# Patient Record
Sex: FEMALE | Race: WHITE | NOT HISPANIC OR LATINO | Employment: FULL TIME | URBAN - METROPOLITAN AREA
[De-identification: names, ages, dates, MRNs, and addresses within clinical notes are randomized per-mention and may not be internally consistent; named-entity substitution may affect disease eponyms.]

---

## 2018-03-24 ENCOUNTER — OFFICE VISIT (OUTPATIENT)
Dept: URGENT CARE | Facility: CLINIC | Age: 33
End: 2018-03-24
Payer: COMMERCIAL

## 2018-03-24 VITALS
WEIGHT: 155 LBS | DIASTOLIC BLOOD PRESSURE: 82 MMHG | HEIGHT: 64 IN | BODY MASS INDEX: 26.46 KG/M2 | TEMPERATURE: 99.4 F | SYSTOLIC BLOOD PRESSURE: 122 MMHG | OXYGEN SATURATION: 100 % | HEART RATE: 94 BPM | RESPIRATION RATE: 16 BRPM

## 2018-03-24 DIAGNOSIS — L25.9 CONTACT DERMATITIS, UNSPECIFIED CONTACT DERMATITIS TYPE, UNSPECIFIED TRIGGER: Primary | ICD-10-CM

## 2018-03-24 PROCEDURE — 99203 OFFICE O/P NEW LOW 30 MIN: CPT | Performed by: PHYSICIAN ASSISTANT

## 2018-03-24 RX ORDER — BUPROPION HYDROCHLORIDE 150 MG/1
TABLET ORAL
COMMUNITY
Start: 2018-01-30

## 2018-03-24 RX ORDER — DROSPIRENONE AND ETHINYL ESTRADIOL TABLETS 0.02-3(28)
KIT ORAL
COMMUNITY
Start: 2018-03-06

## 2018-03-24 RX ORDER — PREDNISONE 50 MG/1
50 TABLET ORAL DAILY
Qty: 5 TABLET | Refills: 0 | Status: SHIPPED | OUTPATIENT
Start: 2018-03-24 | End: 2018-03-29

## 2018-03-24 NOTE — PROGRESS NOTES
[facility]        NAME: Thomas Venegas is a 34 y o  female  : 1985    MRN: [id number]  DATE: 2018  TIME: 1:26 PM    Assessment and Plan   Contact dermatitis, unspecified contact dermatitis type, unspecified trigger [L25 9]  1  Contact dermatitis, unspecified contact dermatitis type, unspecified trigger  predniSONE 50 mg tablet     Patient Instructions     Take steroid as prescribed  otc allergy pill for sxs control  Follow up with PCP in 3-5 days  Proceed to  ER if symptoms worsen  Chief Complaint     Chief Complaint   Patient presents with    Rash     itchy right shoulder rash for 2 weeks treating with OTC went almost away, woke up this morning and back, now on shoulder down arm onto chest and back     History of Present Illness       Rash   This is a new problem  The current episode started yesterday  The problem is unchanged  The affected locations include the left shoulder, right shoulder and neck  The rash is characterized by redness and swelling  It is unknown if there was an exposure to a precipitant  Pertinent negatives include no anorexia, congestion, cough, diarrhea, eye pain, facial edema, fatigue, fever, joint pain, nail changes, rhinorrhea, shortness of breath, sore throat or vomiting  Past treatments include antihistamine  The treatment provided no relief  There is no history of allergies, asthma, eczema or varicella  Review of Systems   Review of Systems   Constitutional: Negative for fatigue and fever  HENT: Negative for congestion, rhinorrhea and sore throat  Eyes: Negative for pain  Respiratory: Negative for cough and shortness of breath  Gastrointestinal: Negative for anorexia, diarrhea and vomiting  Musculoskeletal: Negative for joint pain  Skin: Positive for rash  Negative for color change, nail changes, pallor and wound           Current Medications       Current Outpatient Prescriptions:     buPROPion (WELLBUTRIN XL) 150 mg 24 hr tablet, , Disp: , Rfl:     LORYNA 3-0 02 MG per tablet, , Disp: , Rfl:     predniSONE 50 mg tablet, Take 1 tablet (50 mg total) by mouth daily for 5 days, Disp: 5 tablet, Rfl: 0    Current Allergies     Allergies as of 03/24/2018    (No Known Allergies)            The following portions of the patient's history were reviewed and updated as appropriate: allergies, current medications, past family history, past medical history, past social history, past surgical history and problem list      History reviewed  No pertinent past medical history  Past Surgical History:   Procedure Laterality Date    WISDOM TOOTH EXTRACTION         No family history on file  Medications have been verified  Objective   /82   Pulse 94   Temp 99 4 °F (37 4 °C) (Tympanic)   Resp 16   Ht 5' 4" (1 626 m)   Wt 70 3 kg (155 lb)   SpO2 100%   BMI 26 61 kg/m²        Physical Exam     Physical Exam   Constitutional: She is oriented to person, place, and time  She appears well-developed and well-nourished  Cardiovascular: Normal rate, regular rhythm, normal heart sounds and intact distal pulses  Exam reveals no gallop and no friction rub  No murmur heard  Pulmonary/Chest: Effort normal and breath sounds normal  No respiratory distress  She has no wheezes  She has no rales  She exhibits no tenderness  Abdominal: Soft  Bowel sounds are normal  She exhibits no distension and no mass  There is no tenderness  There is no rebound and no guarding  Neurological: She is alert and oriented to person, place, and time  She displays normal reflexes  No cranial nerve deficit  She exhibits normal muscle tone  Coordination normal    Skin: Rash noted

## 2021-04-08 DIAGNOSIS — Z23 ENCOUNTER FOR IMMUNIZATION: ICD-10-CM

## 2021-04-19 ENCOUNTER — IMMUNIZATIONS (OUTPATIENT)
Dept: FAMILY MEDICINE CLINIC | Facility: HOSPITAL | Age: 36
End: 2021-04-19

## 2021-04-19 DIAGNOSIS — Z23 ENCOUNTER FOR IMMUNIZATION: Primary | ICD-10-CM

## 2021-04-19 PROCEDURE — 0001A SARS-COV-2 / COVID-19 MRNA VACCINE (PFIZER-BIONTECH) 30 MCG: CPT

## 2021-04-19 PROCEDURE — 91300 SARS-COV-2 / COVID-19 MRNA VACCINE (PFIZER-BIONTECH) 30 MCG: CPT

## 2021-05-10 ENCOUNTER — IMMUNIZATIONS (OUTPATIENT)
Dept: FAMILY MEDICINE CLINIC | Facility: HOSPITAL | Age: 36
End: 2021-05-10

## 2021-05-10 DIAGNOSIS — Z23 ENCOUNTER FOR IMMUNIZATION: Primary | ICD-10-CM

## 2021-05-10 PROCEDURE — 0002A SARS-COV-2 / COVID-19 MRNA VACCINE (PFIZER-BIONTECH) 30 MCG: CPT

## 2021-05-10 PROCEDURE — 91300 SARS-COV-2 / COVID-19 MRNA VACCINE (PFIZER-BIONTECH) 30 MCG: CPT

## 2022-04-16 ENCOUNTER — OFFICE VISIT (OUTPATIENT)
Dept: URGENT CARE | Facility: CLINIC | Age: 37
End: 2022-04-16
Payer: COMMERCIAL

## 2022-04-16 VITALS
BODY MASS INDEX: 27.49 KG/M2 | WEIGHT: 161 LBS | OXYGEN SATURATION: 98 % | SYSTOLIC BLOOD PRESSURE: 164 MMHG | HEIGHT: 64 IN | DIASTOLIC BLOOD PRESSURE: 75 MMHG | RESPIRATION RATE: 18 BRPM | TEMPERATURE: 98.5 F | HEART RATE: 85 BPM

## 2022-04-16 DIAGNOSIS — L30.9 DERMATITIS: Primary | ICD-10-CM

## 2022-04-16 PROCEDURE — 99213 OFFICE O/P EST LOW 20 MIN: CPT | Performed by: PHYSICIAN ASSISTANT

## 2022-04-16 PROCEDURE — 96372 THER/PROPH/DIAG INJ SC/IM: CPT | Performed by: PHYSICIAN ASSISTANT

## 2022-04-16 RX ORDER — ALBUTEROL SULFATE 90 UG/1
AEROSOL, METERED RESPIRATORY (INHALATION)
COMMUNITY
Start: 2022-01-07

## 2022-04-16 RX ORDER — METHYLPREDNISOLONE SODIUM SUCCINATE 125 MG/2ML
125 INJECTION, POWDER, LYOPHILIZED, FOR SOLUTION INTRAMUSCULAR; INTRAVENOUS ONCE
Status: COMPLETED | OUTPATIENT
Start: 2022-04-16 | End: 2022-04-16

## 2022-04-16 RX ORDER — METHYLPREDNISOLONE SODIUM SUCCINATE 125 MG/2ML
125 INJECTION, POWDER, LYOPHILIZED, FOR SOLUTION INTRAMUSCULAR; INTRAVENOUS ONCE
Status: DISCONTINUED | OUTPATIENT
Start: 2022-04-16 | End: 2022-04-16

## 2022-04-16 RX ORDER — PREDNISONE 10 MG/1
10 TABLET ORAL DAILY
Qty: 21 TABLET | Refills: 0 | Status: SHIPPED | OUTPATIENT
Start: 2022-04-16

## 2022-04-16 RX ADMIN — METHYLPREDNISOLONE SODIUM SUCCINATE 125 MG: 125 INJECTION, POWDER, LYOPHILIZED, FOR SOLUTION INTRAMUSCULAR; INTRAVENOUS at 11:02

## 2022-04-16 NOTE — PROGRESS NOTES
[facility]        NAME: Ricardo Beltran is a 39 y o  female  : 1985    MRN: [id number]  DATE: 2022  TIME: 11:01 AM    Assessment and Plan   Dermatitis [L30 9]  1  Dermatitis  predniSONE 10 mg tablet    methylPREDNISolone sodium succinate (Solu-MEDROL) injection 125 mg    DISCONTINUED: methylPREDNISolone sodium succinate (Solu-MEDROL) injection 125 mg         Patient Instructions    May use over-the-counter Benadryl in addition to prednisone if needed  Avoid scratching  Cool showers  Ice packs  Observe for signs of infection including redness, cloudy drainage, swelling, streaking up the extremity, fevers and chills, or persistent symptoms  Follow-up with PCP in 3-5 days  Go to ER if symptoms become severe  Follow up with PCP in 3-5 days  Proceed to  ER if symptoms worsen  Chief Complaint     Chief Complaint   Patient presents with    Rash     Started   Generalized w/ itching  Nothing new to the patient to date that she is aware that could have flared this  Took Benadryl  w/o relief         History of Present Illness       Patient is a 48-year-old female with no significant past medical history presents the office complaining of rash for 2 days  Rash began on her legs but is now spreading to her arms, abdomen, and face  Rash is itchy but not painful  She denies fevers, chills, tongue and throat swelling, dysphagia, difficulty breathing, recent illness  Denies new body products, medications, or pets  States she was working in the yard but rash was present prior to this  He tried Benadryl with no relief  Denies prior similar episodes  Review of Systems   Review of Systems   Constitutional: Negative for chills and fever  HENT: Negative for trouble swallowing  Respiratory: Negative for cough and chest tightness  Skin: Positive for rash           Current Medications       Current Outpatient Medications:     albuterol (PROVENTIL HFA,VENTOLIN HFA) 90 mcg/act inhaler, , Disp: , Rfl:     buPROPion (WELLBUTRIN XL) 150 mg 24 hr tablet, , Disp: , Rfl:     etonogestrel (NEXPLANON) subdermal implant, Inject 1 each into the skin, Disp: , Rfl:     LORYNA 3-0 02 MG per tablet, , Disp: , Rfl:     predniSONE 10 mg tablet, Take 1 tablet (10 mg total) by mouth daily Take 6 on day 1, take 5 on day 2, take 4 on day 3, take 3 on day 4, take 2 on day 5, take 1 on day 6 , Disp: 21 tablet, Rfl: 0    Current Facility-Administered Medications:     methylPREDNISolone sodium succinate (Solu-MEDROL) injection 125 mg, 125 mg, Intramuscular, Once, Phani Carpio PA-C    Current Allergies     Allergies as of 04/16/2022    (No Known Allergies)            The following portions of the patient's history were reviewed and updated as appropriate: allergies, current medications, past family history, past medical history, past social history, past surgical history and problem list      Past Medical History:   Diagnosis Date    History of egg donation     x2       Past Surgical History:   Procedure Laterality Date    WISDOM TOOTH EXTRACTION         History reviewed  No pertinent family history  Medications have been verified  Objective   /75   Pulse 85   Temp 98 5 °F (36 9 °C)   Resp 18   Ht 5' 4" (1 626 m)   Wt 73 kg (161 lb)   LMP  (LMP Unknown)   SpO2 98%   BMI 27 64 kg/m²   No LMP recorded (lmp unknown)  (Menstrual status: Birth Control)  Physical Exam     Physical Exam  Vitals and nursing note reviewed  Constitutional:       Appearance: Normal appearance  She is well-developed  HENT:      Head: Normocephalic and atraumatic  Right Ear: Tympanic membrane, ear canal and external ear normal       Left Ear: Tympanic membrane, ear canal and external ear normal       Nose: Nose normal       Mouth/Throat:      Pharynx: Uvula midline     Eyes:      General: Lids are normal       Conjunctiva/sclera: Conjunctivae normal       Pupils: Pupils are equal, round, and reactive to light  Cardiovascular:      Rate and Rhythm: Normal rate and regular rhythm  Pulses: Normal pulses  Heart sounds: Normal heart sounds  No murmur heard  No friction rub  No gallop  Pulmonary:      Effort: Pulmonary effort is normal       Breath sounds: Normal breath sounds  No wheezing, rhonchi or rales  Musculoskeletal:         General: Normal range of motion  Cervical back: Neck supple  Lymphadenopathy:      Cervical: No cervical adenopathy  Skin:     General: Skin is warm and dry  Capillary Refill: Capillary refill takes less than 2 seconds  Findings: Rash (Macular erythematous rash to face, neck, abdomen, arms, and legs  See image ) present  Neurological:      Mental Status: She is alert           Face      Neck      Left hip        Right anterior leg

## 2022-04-16 NOTE — PATIENT INSTRUCTIONS
May use over-the-counter Benadryl in addition to prednisone if needed  Avoid scratching  Cool showers  Ice packs  Observe for signs of infection including redness, cloudy drainage, swelling, streaking up the extremity, fevers and chills, or persistent symptoms  Follow-up with PCP in 3-5 days  Go to ER if symptoms become severe  Dermatitis   WHAT YOU NEED TO KNOW:   Dermatitis is skin inflammation  You may have an itchy rash, redness, or swelling  You may also have bumps or blisters that crust over or ooze clear fluid  Dermatitis can be caused by allergens such as dust mites, pet dander, pollen, and certain foods  It can also develop when something touches your skin and irritates it or causes an allergic reaction  Examples include soaps, chemicals, latex, and poison ivy  DISCHARGE INSTRUCTIONS:   Call your local emergency number (911 in the [address] [facility]) or have someone call if:   · You have symptoms of anaphylaxis, such as sudden trouble breathing, throat swelling, or feeling dizzy or lightheaded  Return to the emergency department if:   · You develop a fever or have red streaks going up your arm or leg  · Your rash gets more swollen, red, or hot  Call your doctor or dermatologist if:   · Your skin blisters, oozes white or yellow pus, or has a foul-smelling discharge  · Your rash spreads or does not get better, even after treatment  · You have questions or concerns about your condition or care  Medicines:   · Medicines  help decrease itching and inflammation, or treat a bacterial infection  They may be given as a topical cream, shot, or a pill  · Take your medicine as directed  Contact your healthcare provider if you think your medicine is not helping or if you have side effects  Tell him of her if you are allergic to any medicine  Keep a list of the medicines, vitamins, and herbs you take  Include the amounts, and when and why you take them   Bring the list or the pill bottles to follow-up visits  Carry your medicine list with you in case of an emergency  Manage dermatitis:   · Apply a cool compress to your rash  This will help soothe your skin  · Apply lotions or creams to the area  These help keep your skin moist and decrease itching  Apply the lotion or cream right after a lukewarm bath or shower when your skin is still damp  Use products that do not contain dye or a scent  · Avoid skin irritants  Examples include makeup, hair products, soaps, and cleansers  Use products that do not contain a scent or dye  Follow up with your doctor or dermatologist as directed:  Write down your questions so you remember to ask them during your visits  © [employer] 2022 Information is for End User's use only and may not be sold, redistributed or otherwise used for commercial purposes  All illustrations and images included in CareNotes® are the copyrighted property of [employer]  or Brandie Garcia  The above information is an  only  It is not intended as medical advice for individual conditions or treatments  Talk to your doctor, nurse or pharmacist before following any medical regimen to see if it is safe and effective for you

## 2023-10-18 ENCOUNTER — APPOINTMENT (EMERGENCY)
Dept: RADIOLOGY | Facility: HOSPITAL | Age: 38
End: 2023-10-18
Payer: COMMERCIAL

## 2023-10-18 ENCOUNTER — HOSPITAL ENCOUNTER (EMERGENCY)
Facility: HOSPITAL | Age: 38
Discharge: HOME | End: 2023-10-18
Attending: EMERGENCY MEDICINE
Payer: COMMERCIAL

## 2023-10-18 VITALS
BODY MASS INDEX: 27.31 KG/M2 | WEIGHT: 160 LBS | TEMPERATURE: 97.8 F | OXYGEN SATURATION: 98 % | HEIGHT: 64 IN | DIASTOLIC BLOOD PRESSURE: 98 MMHG | HEART RATE: 82 BPM | SYSTOLIC BLOOD PRESSURE: 162 MMHG | RESPIRATION RATE: 16 BRPM

## 2023-10-18 DIAGNOSIS — T14.8XXA MUSCULOSKELETAL STRAIN: ICD-10-CM
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
## 2023-10-18 PROCEDURE — 73590 X-RAY EXAM OF LOWER LEG: CPT

## 2023-10-18 PROCEDURE — 99284 EMERGENCY DEPT VISIT MOD MDM: CPT | Performed by: EMERGENCY MEDICINE

## 2023-10-18 PROCEDURE — 73090 X-RAY EXAM OF FOREARM: CPT

## 2023-10-18 PROCEDURE — 72040 X-RAY EXAM NECK SPINE 2-3 VW: CPT

## 2023-10-18 PROCEDURE — 99283 EMERGENCY DEPT VISIT LOW MDM: CPT

## 2023-10-18 PROCEDURE — 72170 X-RAY EXAM OF PELVIS: CPT

## 2023-10-18 PROCEDURE — 71046 X-RAY EXAM CHEST 2 VIEWS: CPT

## 2023-10-18 RX ORDER — ACETAMINOPHEN 325 MG/1
975 TABLET ORAL ONCE
Status: COMPLETED | OUTPATIENT
Start: 2023-10-18 | End: 2023-10-18

## 2023-10-18 RX ORDER — IBUPROFEN 400 MG/1
400 TABLET ORAL ONCE
Status: COMPLETED | OUTPATIENT
Start: 2023-10-18 | End: 2023-10-18

## 2023-10-18 RX ADMIN — ACETAMINOPHEN 975 MG: 325 TABLET, FILM COATED ORAL at 11:42

## 2023-10-18 RX ADMIN — IBUPROFEN 400 MG: 400 TABLET, FILM COATED ORAL at 11:42
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
## 2025-04-01 ENCOUNTER — OFFICE VISIT (OUTPATIENT)
Dept: URGENT CARE | Facility: CLINIC | Age: 40
End: 2025-04-01
Payer: COMMERCIAL

## 2025-04-01 VITALS
WEIGHT: 173.4 LBS | SYSTOLIC BLOOD PRESSURE: 154 MMHG | OXYGEN SATURATION: 99 % | TEMPERATURE: 98 F | HEART RATE: 82 BPM | HEIGHT: 64 IN | DIASTOLIC BLOOD PRESSURE: 85 MMHG | BODY MASS INDEX: 29.6 KG/M2 | RESPIRATION RATE: 16 BRPM

## 2025-04-01 DIAGNOSIS — R21 RASH: Primary | ICD-10-CM

## 2025-04-01 PROCEDURE — S9083 URGENT CARE CENTER GLOBAL: HCPCS

## 2025-04-01 PROCEDURE — G0383 LEV 4 HOSP TYPE B ED VISIT: HCPCS

## 2025-04-01 RX ORDER — PREDNISONE 10 MG/1
TABLET ORAL
Qty: 31 TABLET | Refills: 0 | Status: SHIPPED | OUTPATIENT
Start: 2025-04-01 | End: 2025-04-13

## 2025-04-01 RX ORDER — MOMETASONE FUROATE 1 MG/G
CREAM TOPICAL DAILY
Qty: 15 G | Refills: 0 | Status: SHIPPED | OUTPATIENT
Start: 2025-04-01

## 2025-04-01 NOTE — PATIENT INSTRUCTIONS
Take prednisone as prescribed   Scrub areas exposed to poison plants with washcloth and Elba soap after initial contact  Apply Elocon cream as prescribed (Do not use for longer than 4 weeks)  Wash hands following administration  Oatmeal baths  Avoid scratching area  Topical benadryl cream or calamine lotion during the day  Cool compresses  Allegra and Pepcid over the counter  Oral benadryl for itching as needed at night  Keep area clean and dry  Watch for signs of infection     [Alert] : alert [No Acute Distress] : no acute distress [Normocephalic] : normocephalic [Anterior [place] Closed] : anterior fontanelle closed [Red Reflex Bilateral] : red reflex bilateral [PERRL] : PERRL [Normally Placed Ears] : normally placed ears [Auricles Well Formed] : auricles well formed [Clear Tympanic membranes with present light reflex and bony landmarks] : clear tympanic membranes with present light reflex and bony landmarks [No Discharge] : no discharge [Nares Patent] : nares patent [Palate Intact] : palate intact [Uvula Midline] : uvula midline [Tooth Eruption] : tooth eruption  [Supple, full passive range of motion] : supple, full passive range of motion [No Palpable Masses] : no palpable masses [Symmetric Chest Rise] : symmetric chest rise [Clear to Auscultation Bilaterally] : clear to auscultation bilaterally [Regular Rate and Rhythm] : regular rate and rhythm [S1, S2 present] : S1, S2 present [No Murmurs] : no murmurs [+2 Femoral Pulses] : +2 femoral pulses [Soft] : soft [NonTender] : non tender [Non Distended] : non distended [Normoactive Bowel Sounds] : normoactive bowel sounds [No Hepatomegaly] : no hepatomegaly [No Splenomegaly] : no splenomegaly [Benito 1] : Benito 1 [No Clitoromegaly] : no clitoromegaly [Normal Vaginal Introitus] : normal vaginal introitus [Patent] : patent [Normally Placed] : normally placed [No Abnormal Lymph Nodes Palpated] : no abnormal lymph nodes palpated [No Clavicular Crepitus] : no clavicular crepitus [Symmetric Buttocks Creases] : symmetric buttocks creases [No Spinal Dimple] : no spinal dimple [NoTuft of Hair] : no tuft of hair [Cranial Nerves Grossly Intact] : cranial nerves grossly intact [No Rash or Lesions] : no rash or lesions [FreeTextEntry1] : WELL

## 2025-04-01 NOTE — PROGRESS NOTES
[facility]  Name: Ni Lentz      : 1985      MRN: [id number]  Encounter Provider: Florentin Kan PA-C  Encounter Date: 2025   Encounter department: [facility] [place]  :  Assessment & Plan  Rash    Orders:    predniSONE 10 mg tablet; Take 5 tablets (50 mg total) by mouth daily for 2 days, THEN 4 tablets (40 mg total) daily for 2 days, THEN 3 tablets (30 mg total) daily for 2 days, THEN 2 tablets (20 mg total) daily for 2 days, THEN 1 tablet (10 mg total) daily for 2 days, THEN 0.5 tablets (5 mg total) daily for 2 days.    mometasone (ELOCON) 0.1 % cream; Apply topically daily    Based off triggers and appearance of rash, suspect of it to be potential irritant from plant exposure.  Oral steroid taper with topical cream provided for further treatment.  Follow-up with PCP.    Patient Instructions  Follow up with PCP in 3-5 days.  Proceed to  ER if symptoms worsen.    If tests are performed, our office will contact you with results only if changes need to made to the care plan discussed with you at the visit. [facility]    Chief Complaint:   Chief Complaint   Patient presents with    Rash     Rash with itching to bilateral upper extremities, left abdomen, right hip, and above left eye, which started  night and progressively got worse. Taking OTC - Benadryl, antihistamine cream.     History of Present Illness   39-year-old female presenting with itchy rash x 3 days.  States that she was doing yard work 2 days prior to symptom onset.  Denies any history of reaction to poison ivy or poison oak.  Denies any body with similar symptoms.  States that it started on her arms and then spread to her lower abdomen as well as near her pelvis region.  Noticing that it to be on that her face as well.  States it is itchy and red.  Getting a little bit more inflamed and swollen.  Using antihistamine cream as well as oral Benadryl for symptom relief  however noticing it progressively getting worse.  Denies any fever or chills or bodyaches, nausea vomiting or diarrhea.  Denies any recent sick symptoms.  States that she has had a reaction from working in the yard in the past similar to this.  Denies any acute chemical exposure or irritant exposures that she can think of including, new detergents, new body washes, soaps, fabrics, or materials.    Rash  Pertinent negatives include no congestion, fatigue, fever, shortness of breath, sore throat or vomiting.   X 3 days.      Review of Systems   Constitutional:  Negative for chills, fatigue and fever.   HENT:  Negative for congestion, ear pain and sore throat.    Eyes:  Negative for discharge and redness.   Respiratory:  Negative for chest tightness and shortness of breath.    Cardiovascular:  Negative for chest pain and palpitations.   Gastrointestinal:  Negative for abdominal pain, nausea and vomiting.   Musculoskeletal:  Negative for myalgias.   Skin:  Positive for rash.   Neurological:  Negative for dizziness, light-headedness and headaches.   Psychiatric/Behavioral:  Negative for confusion.      Past Medical History   Past Medical History:   Diagnosis Date    Anxiety     Depression     History of egg donation     x2     Past Surgical History:   Procedure Laterality Date    LIPOSUCTION      WISDOM TOOTH EXTRACTION       Family History   Problem Relation Age of Onset    Hypothyroidism Mother     Hypertension Father     Coronary artery disease Father      she reports that she has never smoked. She has never used smokeless tobacco. She reports current alcohol use. She reports current drug use. Drug: Marijuana.  Current Outpatient Medications   Medication Instructions    albuterol (PROVENTIL HFA,VENTOLIN HFA) 90 mcg/act inhaler No dose, route, or frequency recorded.    buPROPion (WELLBUTRIN XL) 150 mg 24 hr tablet No dose, route, or frequency recorded.    diphenhydrAMINE HCl (BENADRYL PO) Take by mouth    etonogestrel  "(NEXPLANON) subdermal implant 1 each    LORYNA 3-0.02 MG per tablet No dose, route, or frequency recorded.    mometasone (ELOCON) 0.1 % cream Topical, Daily    predniSONE 10 mg tablet Take 5 tablets (50 mg total) by mouth daily for 2 days, THEN 4 tablets (40 mg total) daily for 2 days, THEN 3 tablets (30 mg total) daily for 2 days, THEN 2 tablets (20 mg total) daily for 2 days, THEN 1 tablet (10 mg total) daily for 2 days, THEN 0.5 tablets (5 mg total) daily for 2 days.    predniSONE 10 mg, Oral, Daily, Take 6 on day 1, take 5 on day 2, take 4 on day 3, take 3 on day 4, take 2 on day 5, take 1 on day 6.   No Known Allergies     Objective   /85   Pulse 82   Temp 98 °F (36.7 °C) (Tympanic)   Resp 16   Ht 5' 4\" (1.626 m)   Wt 78.7 kg (173 lb 6.4 oz)   SpO2 99%   BMI 29.76 kg/m²      Physical Exam  Vitals and nursing note reviewed.   Constitutional:       General: She is not in acute distress.     Appearance: She is normal weight.   HENT:      Head: Normocephalic and atraumatic.      Right Ear: External ear normal.      Left Ear: External ear normal.      Nose: Nose normal. No congestion.      Mouth/Throat:      Mouth: Mucous membranes are moist.      Pharynx: Oropharynx is clear. No oropharyngeal exudate.   Eyes:      General:         Right eye: No discharge.         Left eye: No discharge.      Conjunctiva/sclera: Conjunctivae normal.      Pupils: Pupils are equal, round, and reactive to light.   Cardiovascular:      Rate and Rhythm: Normal rate and regular rhythm.      Pulses: Normal pulses.      Heart sounds: Normal heart sounds.   Pulmonary:      Effort: Pulmonary effort is normal. No respiratory distress.      Breath sounds: Normal breath sounds. No wheezing.   Lymphadenopathy:      Cervical: No cervical adenopathy.   Skin:     General: Skin is warm.      Capillary Refill: Capillary refill takes less than 2 seconds.      Comments: Erythematous and raised rash and slight streaks along bilateral " "forearms.  Maculopapular erythematous rash visible on left abdomen.  Nontender.  Pruritic.   Neurological:      General: No focal deficit present.      Mental Status: She is alert and oriented to person, place, and time.   Psychiatric:         Mood and Affect: Mood normal.         Behavior: Behavior normal.         Portions of the record may have been created with voice recognition software.  Occasional wrong word or \"sound a like\" substitutions may have occurred due to the inherent limitations of voice recognition software.  Read the chart carefully and recognize, using context, where substitutions have occurred.  "